# Patient Record
Sex: FEMALE | Race: ASIAN | HISPANIC OR LATINO | Employment: OTHER | URBAN - METROPOLITAN AREA
[De-identification: names, ages, dates, MRNs, and addresses within clinical notes are randomized per-mention and may not be internally consistent; named-entity substitution may affect disease eponyms.]

---

## 2019-01-07 ENCOUNTER — ESTABLISHED COMPREHENSIVE EXAM (OUTPATIENT)
Dept: URBAN - METROPOLITAN AREA CLINIC 35 | Facility: CLINIC | Age: 49
End: 2019-01-07

## 2019-01-07 DIAGNOSIS — H52.4: ICD-10-CM

## 2019-01-07 DIAGNOSIS — H52.203: ICD-10-CM

## 2019-01-07 DIAGNOSIS — H52.13: ICD-10-CM

## 2019-01-07 PROCEDURE — 92015 DETERMINE REFRACTIVE STATE: CPT

## 2019-01-07 PROCEDURE — 92014 COMPRE OPH EXAM EST PT 1/>: CPT

## 2019-01-07 ASSESSMENT — VISUAL ACUITY
OD_SC: 20/40
OS_SC: 20/30-1
OD_SC: J3
OS_SC: J3

## 2019-01-07 ASSESSMENT — TONOMETRY
OS_IOP_MMHG: 14
OD_IOP_MMHG: 14

## 2020-01-21 ENCOUNTER — ESTABLISHED COMPREHENSIVE EXAM (OUTPATIENT)
Dept: URBAN - METROPOLITAN AREA CLINIC 38 | Facility: CLINIC | Age: 50
End: 2020-01-21

## 2020-01-21 DIAGNOSIS — H52.203: ICD-10-CM

## 2020-01-21 DIAGNOSIS — H52.4: ICD-10-CM

## 2020-01-21 DIAGNOSIS — H52.13: ICD-10-CM

## 2020-01-21 PROCEDURE — 92015 DETERMINE REFRACTIVE STATE: CPT

## 2020-01-21 PROCEDURE — 92014 COMPRE OPH EXAM EST PT 1/>: CPT

## 2020-01-21 ASSESSMENT — VISUAL ACUITY
OD_SC: J4
OS_SC: J1-
OD_SC: 20/20
OS_SC: 20/25

## 2020-01-21 ASSESSMENT — TONOMETRY
OS_IOP_MMHG: 18
OD_IOP_MMHG: 16

## 2020-12-21 NOTE — PATIENT DISCUSSION
Observe. Quality 110: Preventive Care And Screening: Influenza Immunization: Influenza Immunization Administered during Influenza season Quality 111:Pneumonia Vaccination Status For Older Adults: Pneumococcal Vaccination Previously Received Detail Level: Detailed Quality 130: Documentation Of Current Medications In The Medical Record: Current Medications Documented

## 2020-12-21 NOTE — PROCEDURE NOTE: CLINICAL
PROCEDURE NOTE: Removal of Conjunctival FB, Superficial #1 OD. Diagnosis: Foreign Body in Conjunctival Sac. Anesthesia: Topical. Prior to treatment, the risks/benefits/alternatives were discussed. The patient wished to proceed with procedure. Superficial conjunctival FB removed with forcep. Globe and conjunctiva intact. Patient tolerated procedure well. There were no complications. Post procedure instructions given. Dolores Verduzco

## 2020-12-22 NOTE — PATIENT DISCUSSION
Continue Prednisolone acetate 1% q1hr for today and taper to q2hrs starting tomorrow, Taper vigamox QID OD, continue cyclogyl TID OD.

## 2021-01-29 ENCOUNTER — ESTABLISHED COMPREHENSIVE EXAM (OUTPATIENT)
Dept: URBAN - METROPOLITAN AREA CLINIC 38 | Facility: CLINIC | Age: 51
End: 2021-01-29

## 2021-01-29 DIAGNOSIS — H52.13: ICD-10-CM

## 2021-01-29 DIAGNOSIS — H52.203: ICD-10-CM

## 2021-01-29 DIAGNOSIS — H52.4: ICD-10-CM

## 2021-01-29 DIAGNOSIS — D23.121: ICD-10-CM

## 2021-01-29 DIAGNOSIS — D23.111: ICD-10-CM

## 2021-01-29 PROCEDURE — 92014 COMPRE OPH EXAM EST PT 1/>: CPT

## 2021-01-29 PROCEDURE — 92015 DETERMINE REFRACTIVE STATE: CPT

## 2021-01-29 ASSESSMENT — TONOMETRY
OS_IOP_MMHG: 18
OD_IOP_MMHG: 18

## 2021-01-29 ASSESSMENT — VISUAL ACUITY
OD_SC: 20/25+2
OS_SC: J2-
OD_SC: J2-
OS_SC: 20/20-2

## 2022-02-04 ENCOUNTER — COMPREHENSIVE EXAM (OUTPATIENT)
Dept: URBAN - METROPOLITAN AREA CLINIC 38 | Facility: CLINIC | Age: 52
End: 2022-02-04

## 2022-02-04 DIAGNOSIS — H52.12: ICD-10-CM

## 2022-02-04 DIAGNOSIS — H52.203: ICD-10-CM

## 2022-02-04 DIAGNOSIS — H52.4: ICD-10-CM

## 2022-02-04 PROCEDURE — 92015 DETERMINE REFRACTIVE STATE: CPT

## 2022-02-04 PROCEDURE — 92014 COMPRE OPH EXAM EST PT 1/>: CPT

## 2022-02-04 ASSESSMENT — VISUAL ACUITY
OU_SC: J2-
OD_SC: 20/20
OU_SC: 20/20
OS_SC: 20/20-1

## 2022-02-04 ASSESSMENT — TONOMETRY
OS_IOP_MMHG: 16
OD_IOP_MMHG: 18

## 2024-03-22 ENCOUNTER — COMPREHENSIVE EXAM (OUTPATIENT)
Dept: URBAN - METROPOLITAN AREA CLINIC 38 | Facility: CLINIC | Age: 54
End: 2024-03-22

## 2024-03-22 DIAGNOSIS — H52.201: ICD-10-CM

## 2024-03-22 DIAGNOSIS — H52.4: ICD-10-CM

## 2024-03-22 DIAGNOSIS — H52.12: ICD-10-CM

## 2024-03-22 PROCEDURE — 92014 COMPRE OPH EXAM EST PT 1/>: CPT

## 2024-03-22 PROCEDURE — 92015 DETERMINE REFRACTIVE STATE: CPT

## 2024-03-22 ASSESSMENT — VISUAL ACUITY
OU_CC: J1
OS_CC: J1
OU_SC: 20/20
OD_SC: 20/20
OD_CC: J1
OD_OTHER: OTC +1.50
OS_SC: 20/20-1

## 2024-03-22 ASSESSMENT — TONOMETRY
OD_IOP_MMHG: 16
OS_IOP_MMHG: 16

## 2025-03-28 ENCOUNTER — COMPREHENSIVE EXAM (OUTPATIENT)
Age: 55
End: 2025-03-28

## 2025-03-28 DIAGNOSIS — H52.201: ICD-10-CM

## 2025-03-28 DIAGNOSIS — H52.4: ICD-10-CM

## 2025-03-28 PROCEDURE — 92014 COMPRE OPH EXAM EST PT 1/>: CPT

## 2025-03-28 PROCEDURE — 92015 DETERMINE REFRACTIVE STATE: CPT
